# Patient Record
Sex: MALE | Race: WHITE | NOT HISPANIC OR LATINO | Employment: OTHER | URBAN - METROPOLITAN AREA
[De-identification: names, ages, dates, MRNs, and addresses within clinical notes are randomized per-mention and may not be internally consistent; named-entity substitution may affect disease eponyms.]

---

## 2018-11-19 ENCOUNTER — OFFICE VISIT (OUTPATIENT)
Dept: RADIATION ONCOLOGY | Facility: HOSPITAL | Age: 63
End: 2018-11-19

## 2018-11-19 ENCOUNTER — HOSPITAL ENCOUNTER (OUTPATIENT)
Dept: RADIATION ONCOLOGY | Facility: HOSPITAL | Age: 63
Setting detail: RADIATION/ONCOLOGY SERIES
Discharge: HOME | End: 2018-11-19

## 2018-11-19 ENCOUNTER — DOCUMENTATION (OUTPATIENT)
Dept: RADIATION ONCOLOGY | Facility: HOSPITAL | Age: 63
End: 2018-11-19

## 2018-11-19 VITALS
OXYGEN SATURATION: 95 % | DIASTOLIC BLOOD PRESSURE: 79 MMHG | HEART RATE: 105 BPM | SYSTOLIC BLOOD PRESSURE: 155 MMHG | HEIGHT: 70 IN | TEMPERATURE: 97 F | WEIGHT: 240.1 LBS | RESPIRATION RATE: 18 BRPM | BODY MASS INDEX: 34.37 KG/M2

## 2018-11-19 DIAGNOSIS — C61 PROSTATE CANCER (HCC): Primary | ICD-10-CM

## 2018-11-19 PROBLEM — N40.0 BPH (BENIGN PROSTATIC HYPERPLASIA): Status: ACTIVE | Noted: 2018-11-19

## 2018-11-19 PROBLEM — I10 HYPERTENSION: Status: ACTIVE | Noted: 2018-11-19

## 2018-11-19 PROBLEM — K21.9 GERD (GASTROESOPHAGEAL REFLUX DISEASE): Status: ACTIVE | Noted: 2018-11-19

## 2018-11-19 PROBLEM — E11.9 TYPE 2 DIABETES MELLITUS (HCC): Status: ACTIVE | Noted: 2018-11-19

## 2018-11-19 PROCEDURE — G0463 HOSPITAL OUTPT CLINIC VISIT: HCPCS | Performed by: RADIOLOGY

## 2018-11-19 RX ORDER — RANITIDINE 300 MG/1
TABLET ORAL
COMMUNITY

## 2018-11-19 RX ORDER — DUTASTERIDE 0.5 MG/1
CAPSULE, LIQUID FILLED ORAL
COMMUNITY
End: 2019-10-14

## 2018-11-19 RX ORDER — TAMSULOSIN HYDROCHLORIDE 0.4 MG/1
CAPSULE ORAL
COMMUNITY

## 2018-11-19 RX ORDER — AMLODIPINE BESYLATE 10 MG/1
TABLET ORAL
COMMUNITY

## 2018-11-19 RX ORDER — GABAPENTIN 800 MG/1
TABLET ORAL
COMMUNITY

## 2018-11-19 RX ORDER — POLYETHYLENE GLYCOL 3350 17 G/17G
17 POWDER, FOR SOLUTION ORAL DAILY
COMMUNITY

## 2018-11-19 RX ORDER — ROSUVASTATIN CALCIUM 10 MG/1
10 TABLET, COATED ORAL DAILY
COMMUNITY

## 2018-11-19 RX ORDER — TADALAFIL 5 MG/1
5 TABLET ORAL DAILY
Refills: 3 | COMMUNITY
Start: 2018-10-17 | End: 2018-11-19 | Stop reason: SDUPTHER

## 2018-11-19 RX ORDER — LANSOPRAZOLE 30 MG/1
30 CAPSULE, DELAYED RELEASE ORAL DAILY
Refills: 3 | COMMUNITY
Start: 2018-10-11

## 2018-11-19 RX ORDER — FLUTICASONE PROPIONATE 50 MCG
SPRAY, SUSPENSION (ML) NASAL
COMMUNITY

## 2018-11-19 RX ORDER — TADALAFIL 5 MG/1
TABLET ORAL
COMMUNITY

## 2018-11-19 RX ORDER — LOSARTAN POTASSIUM 100 MG/1
TABLET ORAL
COMMUNITY
End: 2019-03-19

## 2018-11-19 NOTE — PROGRESS NOTES
RADIATION ONCOLOGY PROGRESS NOTE  11/19/18    There were no vitals filed for this visit.  Pt notified of the following appts:  12/5/18- Markers and urolift with Dr. Harper ( office to mail packet with instructions and time)  12/19/18- @ 9:00 CK sim  @9:30-clinic  @1000-CT sim  @1030-MRI for 11:00 scan  Instructed on the importance of following the ck diet with gas -x 4 times per day starting 12/17/18.  instructed the pt to be NPO for 6 hours prior to the MRI and perform an enema the morning of 12/19/18. Pt verbalized understanding    Message to Gabi Hightower RD

## 2018-11-19 NOTE — PROGRESS NOTES
CONSULTATION NOTE      :                                                          1955  DATE OF CONSULTATION:                       2018   REQUESTING PHYSICIAN:                   Gomez Harper, *  REASON FOR CONSULTATION:           Prostate Cancer  Cancer Staging  Stage IIB (cT1c, cN0, cM0, PSA: 8.4, Grade Group: 2)    Thank you for requesting my services in evaluation of this pleasant individual.  I am seeing them in outpatient consultation regarding a diagnosis of     BRIEF HISTORY:  The patient is a very pleasant 63 y.o. male  with multiple medical comorbidities who presents for consultation regarding a new diagnosis of prostate cancer.  Mr. Kennedy has been followed for nearly a decade for symptoms of BPH and a elevated PSA.  By report he has had PSA values fluctuating between 5 and 10 over the years that is been attributed to BPH.  He takes both Flomax and Avodart, and has previously underwent 2 separate prostate biopsies both of which revealed no evidence of invasive malignancy.  More recently he presented for what he thought was a urinary tract infection, and when a PSA was checked revealing a level of 8.44, he was again referred for a prostate biopsy.  This time he underwent a cystoscopy as well as prostate saturation biopsies which identified a Tom 3+3=6 prostate adenocarcinoma in the right apex as well as a Tom 3+4 = 7 in the left mid gland.  He has had discussions with Dr. Harper regarding radical prostatectomy, but due to his medical comorbidities as well as his age, it was felt that he would be more appropriate for a primary radiation modality.  He is being referred to my clinic today for consideration of definitive radiation therapy.  From a symptomatic standpoint, he reports an IPS as score today of 11 with symptoms of incomplete emptying and frequency less than half the time, weak Stream approximately 50% of the time, and intermittency more than half the time.  As  mentioned, he takes Avodart and Flomax.  He reports that he is somewhat mixed with his current quality of life, and he scores low to moderate confidence with erectile dysfunction.  He denies any gastrointestinal complaints.    Allergies   Allergen Reactions   • Amoxicillin Rash   • Sulfamethoxazole-Trimethoprim Rash       Social History     Socioeconomic History   • Marital status: Unknown     Spouse name: Not on file   • Number of children: Not on file   • Years of education: Not on file   • Highest education level: Not on file   Tobacco Use   • Smoking status: Never Smoker   • Smokeless tobacco: Never Used   Substance and Sexual Activity   • Alcohol use: Yes     Comment: 1 beer per day   • Drug use: No   • Sexual activity: Defer       Past Medical History:   Diagnosis Date   • Diabetes mellitus (CMS/HCC)    • GERD (gastroesophageal reflux disease)    • Hypertension    • Prostate cancer (CMS/HCC)        family history is not on file.     Past Surgical History:   Procedure Laterality Date   • APPENDECTOMY     • COLONOSCOPY      5-6 years ago   • HERNIA REPAIR Bilateral         Review of Systems   HENT:   Positive for tinnitus.    Gastrointestinal: Positive for constipation (takes miralax).   Hematological: Bruises/bleeds easily.   All other systems reviewed and are negative.           IPSS Questionnaire (AUA-7):  Over the past month…    1)  Incomplete Emptying  How often have you had a sensation of not emptying your bladder?  2 - Less than half the time   2)  Frequency  How often have you had to urinate less than every two hours? 2 - Less than half the time   3)  Intermittency  How often have you found you stopped and started again several times when you urinated?  4 - More than half the time   4) Urgency  How often have you found it difficult to postpone urination?  0 - Not at all   5) Weak Stream  How often have you had a weak urinary stream?  3 - About half the time   6) Straining  How often have you had to push  "or strain to begin urination?  0 - Not at all   7) Nocturia  How many times did you typically get up at night to urinate?  0 - None   Total Score:  11       Quality of life due to urinary symptoms:  If you were to spend the rest of your life with your urinary condition the way it is now, how would you feel about that? 3-Mixed   Urine Leakage (Incontinence) 0-No Leakage     Sexual Health Inventory  Current Status    1)  How do you rate your confidence that you could achieve and keep an erection? 3-Moderate   2) When you had erections with sexual stimulation, how often were your erections hard enough for penetration (entering your partner)? 2-A few times (much less than half the time)   3)  During sexual intercourse, how often were you able to maintain your erection after you had penetrated (entered) into your partner? 3-Sometimes (about half the time)   4) During sexual intercourse, how difficult was it to maintain your erection to completion of intercourse? 2-Very difficult   5) When you attempted sexual intercourse, how often was it satisfactory to you? 1-Almost never or never   Total Score: 11       Bowel Health Inventory  Current Status: 0-No problems, no rectal bleeding, no discharge, less then 5 bowel movements a day               Objective   VITAL SIGNS:   Vitals:    11/19/18 0905   BP: 155/79   Pulse: 105   Resp: 18   Temp: 97 °F (36.1 °C)   TempSrc: Temporal   SpO2: 95%   Weight: 109 kg (240 lb 1.6 oz)   Height: 177.8 cm (70\")   PainSc: 0-No pain        Karnofsky score: 90        Physical Exam   Constitutional: He is oriented to person, place, and time. He appears well-developed and well-nourished. No distress.   HENT:   Head: Normocephalic and atraumatic.   Mouth/Throat: Oropharynx is clear and moist.   Eyes: Conjunctivae and EOM are normal. Pupils are equal, round, and reactive to light.   Neck: Normal range of motion. Neck supple.   Cardiovascular: Normal rate and regular rhythm. Exam reveals no friction " rub.   No murmur heard.  Pulmonary/Chest: Effort normal and breath sounds normal. He has no wheezes.   Abdominal: Soft. Bowel sounds are normal. He exhibits no distension and no mass. There is no tenderness.   Genitourinary:   Genitourinary Comments: 25gm prostate, no nodules or abnormalities   Musculoskeletal: Normal range of motion. He exhibits no edema.   Lymphadenopathy:     He has no cervical adenopathy.   Neurological: He is alert and oriented to person, place, and time.   Skin: Skin is warm and dry.   Psychiatric: He has a normal mood and affect. His behavior is normal. Judgment and thought content normal.   Nursing note and vitals reviewed.    PATHOLOGY  Prostate Biopsies 10/18/2018:  Left base: Benign prostatic tissue  Left mid-prostate adenocarcinoma, [place] score 3+4 = 7, one focus involving 1.5 mm of the core, representing 5% of submitted tissue  Left apex: Benign prostatic tissue  Right base: Benign prostatic tissue  Right mid benign prostatic tissue  Right apex-prostate adenocarcinoma Tom 3+3 equal 6 with 2 foci measuring 4 and 5 mm respectively involving 2 separate biopsy fragments.  Tumor represents 30% of submitted tissue  Left lateral base: Benign prostatic tissue  Left lateral mid: Benign prostatic tissue left lateral apex: Benign prostatic tissue  Right lateral base: Benign prostatic tissue   Right lateral mid: Benign prostatic tissue   Right lateral apex: Prostatic adenocarcinoma, Tom 3+3 = 6, single focus measuring 4 mm, representing 15% of submitted tissue  Left transitional: Benign prostatic tissue   Right transitional: Benign prostatic tissue       The following portions of the patient's history were reviewed and updated as appropriate: allergies, current medications, past family history, past medical history, past social history, past surgical history and problem list.    Assessment  Mr. Kennedy is a 63-year-old gentleman with multiple medical comorbidities who presents now with an  intermediate risk prostate adenocarcinoma, a clinical T1c, [place] 3+4 =, and pretreatment PSA of 8.44 (although adjusting for alpha-blocker therapy, his PSA would be 16.8).  I discussed several treatment options with him including surgical resection, prostate brachytherapy, standard external beam radiation therapy, as well as stereotactic body radiation therapy.  He is not a good candidate for active observation based on the age and [place] score of 7.  Further, when I described the procedure of prostate brachytherapy, he was not very interested and I fear that he would experience excessive urinary symptoms as his baseline IPSS score is 11 in the setting of dual therapy with Avodart and Flomax.  Considering he has a relatively small prostate, as long as there is no evidence of regional disease, I think he would be an excellent candidate for stereotactic body radiosurgery.  He has no evidence of extracapsular disease on exam and given the overall paucity of disease identified on his saturation biopsies, I think the likelihood that we will be able to control disease within the gland is very high and that he has a very low risk at present for having any extraprostatic disease.  After full explanation of the risks and benefits, he was most interested in pursuing prostate stereotactic radiosurgery.  I discussed the logistics with him.  I have quoted orders for a CT scan of the abdomen and pelvis as well as a nuclear medicine bone scan for baseline staging.  I'll send him back to Dr. Berrios's office to have fiducial placement as well as to undergo the planned Urolift procedure that he recommended.  Once this is completed, I will have him back in to my clinic with an MRI for planning and we will undergo the CyberKnife planning session.  I then anticipate that he should be ready to begin treatment approximately one to 2 weeks after pending insurance authorization.      RECOMMENDATIONS:      Return in about 1 month  (around 12/19/2018) for Radiation Simulation.  Napoleon was seen today for prostate cancer.    Diagnoses and all orders for this visit:    Prostate cancer (CMS/HCC)  -     NM Bone Scan Whole Body; Future  -     CT Pelvis With Contrast; Future  -     Creatinine, Serum; Future  -     MRI Cyberknife Pelvis Without Contrast; Future    Thank you for allowing me to participate in the care of this individual.    Sincerely,       Donte Dee MD

## 2018-11-21 ENCOUNTER — TELEPHONE (OUTPATIENT)
Dept: RADIATION ONCOLOGY | Facility: HOSPITAL | Age: 63
End: 2018-11-21

## 2018-11-21 NOTE — TELEPHONE ENCOUNTER
Pt left VM requesting a return call for a question. I called the pt back. Pt request clarification of upcoming appts. I explained the purpose of the CT and bone scan and went over again the planning sim and MRI as well as the importance of the CK diet with gas x, NPO for 6 hours prior to MRI and enema the morning of 12/19/18. Pt verbalized understanding. Pt encouraged to call with any further questions.

## 2018-11-28 ENCOUNTER — HOSPITAL ENCOUNTER (OUTPATIENT)
Dept: NUCLEAR MEDICINE | Facility: HOSPITAL | Age: 63
Discharge: HOME | End: 2018-11-28
Attending: RADIOLOGY

## 2018-11-28 ENCOUNTER — HOSPITAL ENCOUNTER (OUTPATIENT)
Dept: CT IMAGING | Facility: HOSPITAL | Age: 63
Discharge: HOME | End: 2018-11-28
Attending: RADIOLOGY | Admitting: RADIOLOGY

## 2018-11-28 DIAGNOSIS — C61 PROSTATE CANCER (HCC): ICD-10-CM

## 2018-11-28 PROCEDURE — 82565 ASSAY OF CREATININE: CPT

## 2018-11-28 PROCEDURE — A9503 TC99M MEDRONATE: HCPCS | Performed by: RADIOLOGY

## 2018-11-28 PROCEDURE — 0 TECHNETIUM MEDRONATE KIT: Performed by: RADIOLOGY

## 2018-11-28 PROCEDURE — 72193 CT PELVIS W/DYE: CPT

## 2018-11-28 PROCEDURE — 25010000002 IOPAMIDOL 61 % SOLUTION: Performed by: RADIOLOGY

## 2018-11-28 PROCEDURE — 78306 BONE IMAGING WHOLE BODY: CPT

## 2018-11-28 RX ORDER — TC 99M MEDRONATE 20 MG/10ML
24.7 INJECTION, POWDER, LYOPHILIZED, FOR SOLUTION INTRAVENOUS
Status: COMPLETED | OUTPATIENT
Start: 2018-11-28 | End: 2018-11-28

## 2018-11-28 RX ADMIN — IOPAMIDOL 95 ML: 612 INJECTION, SOLUTION INTRAVENOUS at 08:45

## 2018-11-28 RX ADMIN — Medication 24.7 MILLICURIE: at 07:52

## 2018-12-03 LAB — CREAT BLDA-MCNC: 1.2 MG/DL (ref 0.6–1.3)

## 2018-12-05 ENCOUNTER — TELEPHONE (OUTPATIENT)
Dept: RADIATION ONCOLOGY | Facility: HOSPITAL | Age: 63
End: 2018-12-05

## 2018-12-05 NOTE — TELEPHONE ENCOUNTER
Pt left VM to call him- called pt back. Pt states did not have markers placed today- insurance issues- pt will call when appt made for markers and we will reschedule re-eval and MRI if needed.- pt verbalized understanding

## 2018-12-10 ENCOUNTER — DOCUMENTATION (OUTPATIENT)
Dept: RADIATION ONCOLOGY | Facility: HOSPITAL | Age: 63
End: 2018-12-10

## 2018-12-10 NOTE — PROGRESS NOTES
RADIATION ONCOLOGY PROGRESS NOTE  12/10/18    Pt called to say he did not have markers placed. States his insurance denied the urolift and so he was waiting for approval and have markers and urolift at the same time. Pt instructed to notify me when marker appt is made. Explained treatment and planning are on hold until markers are placed. Pt verbalized understanding.

## 2018-12-13 ENCOUNTER — TELEPHONE (OUTPATIENT)
Dept: NUTRITION | Facility: HOSPITAL | Age: 63
End: 2018-12-13

## 2018-12-19 ENCOUNTER — APPOINTMENT (OUTPATIENT)
Dept: MRI IMAGING | Facility: HOSPITAL | Age: 63
End: 2018-12-19
Attending: RADIOLOGY

## 2019-01-28 ENCOUNTER — TELEPHONE (OUTPATIENT)
Dept: RADIATION ONCOLOGY | Facility: HOSPITAL | Age: 64
End: 2019-01-28

## 2019-01-28 NOTE — TELEPHONE ENCOUNTER
Pt left VM asking me to call- I called pt and he inquired if urolift is part of ck prostate treatment- I told the pt it is not- that we need his urologist to place 4-5 prostate markers for ck prostate treatment. Pt states he sees Dr. Harper tomorrow and will notify me when markers are placed so we can move forward with treatment planning-

## 2019-02-08 ENCOUNTER — TELEPHONE (OUTPATIENT)
Dept: RADIATION ONCOLOGY | Facility: HOSPITAL | Age: 64
End: 2019-02-08

## 2019-02-08 ENCOUNTER — TELEPHONE (OUTPATIENT)
Dept: NUTRITION | Facility: HOSPITAL | Age: 64
End: 2019-02-08

## 2019-02-08 DIAGNOSIS — C61 PROSTATE CANCER (HCC): Primary | ICD-10-CM

## 2019-02-08 NOTE — TELEPHONE ENCOUNTER
Called pt with the following appts:  2/15/19 @ 1000 clinic  @ 1030 CK sim  @ 1100 CT sim  @ 1130 MRI for noon scan    Educated pt on the importance of following the ck diet with gas-x 4 times per day starting 2/13/19.  Instructed to be NPO for 6 hours prior to MRI and to perform an enema the morning of 2/15/19.  Verbalized understanding.    Message to Gabi Hightower RT

## 2019-02-15 ENCOUNTER — OFFICE VISIT (OUTPATIENT)
Dept: RADIATION ONCOLOGY | Facility: HOSPITAL | Age: 64
End: 2019-02-15

## 2019-02-15 ENCOUNTER — HOSPITAL ENCOUNTER (OUTPATIENT)
Dept: RADIATION ONCOLOGY | Facility: HOSPITAL | Age: 64
Discharge: HOME | End: 2019-02-15

## 2019-02-15 ENCOUNTER — HOSPITAL ENCOUNTER (OUTPATIENT)
Dept: MRI IMAGING | Facility: HOSPITAL | Age: 64
Discharge: HOME | End: 2019-02-15
Admitting: RADIOLOGY

## 2019-02-15 ENCOUNTER — HOSPITAL ENCOUNTER (OUTPATIENT)
Dept: RADIATION ONCOLOGY | Facility: HOSPITAL | Age: 64
Setting detail: RADIATION/ONCOLOGY SERIES
Discharge: HOME | End: 2019-02-15

## 2019-02-15 VITALS
RESPIRATION RATE: 18 BRPM | DIASTOLIC BLOOD PRESSURE: 76 MMHG | HEART RATE: 94 BPM | OXYGEN SATURATION: 97 % | SYSTOLIC BLOOD PRESSURE: 139 MMHG | TEMPERATURE: 96.2 F | WEIGHT: 230.2 LBS | BODY MASS INDEX: 33.03 KG/M2

## 2019-02-15 DIAGNOSIS — C61 PROSTATE CANCER (HCC): ICD-10-CM

## 2019-02-15 PROCEDURE — 72195 MRI PELVIS W/O DYE: CPT

## 2019-02-15 PROCEDURE — G0463 HOSPITAL OUTPT CLINIC VISIT: HCPCS | Performed by: RADIOLOGY

## 2019-02-15 PROCEDURE — 77290 THER RAD SIMULAJ FIELD CPLX: CPT | Performed by: RADIOLOGY

## 2019-02-15 NOTE — PROGRESS NOTES
RE-EVALUATION    PATIENT:                                                      Napoleon Kennedy  :                                                          1955  DATE:                          2/15/2019   DIAGNOSIS:     Prostate cancer  Cancer Staging  Stage IIB (cT1c, cN0, cM0, PSA: 8.4, Grade Group: 2)       BRIEF HISTORY:  The patient is a very pleasant 63 y.o. male  with favorable intermediate risk prostate cancer who returns today for reevaluation prior to undergoing treatment using CyberKnife radiosurgery.  Since I last saw him in November, he has been seeking pre-treatment with a Urolift procedure, which unfortunately he has been unable to get this cleared through his insurance.  He therefore has had somewhat of a delay in starting treatment.  He eventually decided to no longer pursue that procedure, and successfully underwent fiducial placement on  and her returns today in order to undergo treatment planning and an MRI of the prostate.  From a symptomatic standpoint, he reports no change in his urinary complaints.  He remains on Flomax and Dutasteride.      Allergies   Allergen Reactions   • Amoxicillin Rash   • Sulfamethoxazole-Trimethoprim Rash       Review of Systems   HENT:   Positive for tinnitus.    All other systems reviewed and are negative.           IPSS Questionnaire (AUA-7):  Over the past month…     1)  Incomplete Emptying  How often have you had a sensation of not emptying your bladder?  2 - Less than half the time   2)  Frequency  How often have you had to urinate less than every two hours? 2 - Less than half the time   3)  Intermittency  How often have you found you stopped and started again several times when you urinated?  4 - More than half the time   4) Urgency  How often have you found it difficult to postpone urination?  0 - Not at all   5) Weak Stream  How often have you had a weak urinary stream?  3 - About half the time   6) Straining  How often have you had to push  or strain to begin urination?  0 - Not at all   7) Nocturia  How many times did you typically get up at night to urinate?  0 - None   Total Score:  11         Quality of life due to urinary symptoms:  If you were to spend the rest of your life with your urinary condition the way it is now, how would you feel about that? 3-Mixed   Urine Leakage (Incontinence) 0-No Leakage      Sexual Health Inventory  Current Status     1)  How do you rate your confidence that you could achieve and keep an erection? 3-Moderate   2) When you had erections with sexual stimulation, how often were your erections hard enough for penetration (entering your partner)? 2-A few times (much less than half the time)   3)  During sexual intercourse, how often were you able to maintain your erection after you had penetrated (entered) into your partner? 3-Sometimes (about half the time)   4) During sexual intercourse, how difficult was it to maintain your erection to completion of intercourse? 2-Very difficult   5) When you attempted sexual intercourse, how often was it satisfactory to you? 1-Almost never or never   Total Score: 11         Bowel Health Inventory  Current Status: 0-No problems, no rectal bleeding, no discharge, less then 5 bowel movements a day                       Objective   VITAL SIGNS:   Vitals:    02/15/19 1004   BP: 139/76   Pulse: 94   Resp: 18   Temp: 96.2 °F (35.7 °C)   TempSrc: Temporal   SpO2: 97%   Weight: 104 kg (230 lb 3.2 oz)   PainSc: 0-No pain        KPS 90%    Physical Exam   Constitutional: He is oriented to person, place, and time. He appears well-developed and well-nourished. No distress.   HENT:   Head: Normocephalic and atraumatic.   Mouth/Throat: Oropharynx is clear and moist.   Eyes: Conjunctivae and EOM are normal. Pupils are equal, round, and reactive to light.   Neck: Normal range of motion. Neck supple.   Cardiovascular: Normal rate and regular rhythm. Exam reveals no friction rub.   No murmur  heard.  Pulmonary/Chest: Effort normal and breath sounds normal. He has no wheezes.   Abdominal: Soft. Bowel sounds are normal. He exhibits no distension and no mass. There is no tenderness.   Musculoskeletal: Normal range of motion. He exhibits no edema.   Lymphadenopathy:     He has no cervical adenopathy.   Neurological: He is alert and oriented to person, place, and time.   Skin: Skin is warm and dry.   Psychiatric: He has a normal mood and affect. His behavior is normal. Judgment and thought content normal.   Nursing note and vitals reviewed.           The following portions of the patient's history were reviewed and updated as appropriate: allergies, past family history, past medical history, past social history, past surgical history and problem list.    Diagnoses and all orders for this visit:    Prostate cancer (CMS/[facility])      IMPRESSION:  Mr. Kennedy is a 63 year old gentleman with favorable intermediate risk prostate cancer.  He has completed the preparatory diet and bowel prep.  We will complete his radiation setup today and his pelvic MRI.  I will be working with my physics team over the next week to create his radiation treatment plan for Cyberknife which should consist of 35 Gy in 5 fractions of 7 Gy each, and I anticipate that we should be ready to begin his treatments in approximately 1-2 weeks.    RECOMMENDATIONS:    Return to clinic in 1-2 weeks in order to begin radiation therapy       Donte Dee MD

## 2019-02-21 PROCEDURE — 77300 RADIATION THERAPY DOSE PLAN: CPT | Performed by: RADIOLOGY

## 2019-02-21 PROCEDURE — 77295 3-D RADIOTHERAPY PLAN: CPT | Performed by: RADIOLOGY

## 2019-02-21 PROCEDURE — 77334 RADIATION TREATMENT AID(S): CPT | Performed by: RADIOLOGY

## 2019-03-04 ENCOUNTER — HOSPITAL ENCOUNTER (OUTPATIENT)
Dept: RADIATION ONCOLOGY | Facility: HOSPITAL | Age: 64
Discharge: HOME | End: 2019-03-04

## 2019-03-04 ENCOUNTER — HOSPITAL ENCOUNTER (OUTPATIENT)
Dept: RADIATION ONCOLOGY | Facility: HOSPITAL | Age: 64
Setting detail: RADIATION/ONCOLOGY SERIES
Discharge: HOME | End: 2019-03-04

## 2019-03-04 PROCEDURE — 77280 THER RAD SIMULAJ FIELD SMPL: CPT | Performed by: RADIOLOGY

## 2019-03-04 PROCEDURE — 77373 STRTCTC BDY RAD THER TX DLVR: CPT | Performed by: RADIOLOGY

## 2019-03-05 ENCOUNTER — HOSPITAL ENCOUNTER (OUTPATIENT)
Dept: RADIATION ONCOLOGY | Facility: HOSPITAL | Age: 64
Discharge: HOME | End: 2019-03-05

## 2019-03-05 ENCOUNTER — TELEPHONE (OUTPATIENT)
Dept: RADIATION ONCOLOGY | Facility: HOSPITAL | Age: 64
End: 2019-03-05

## 2019-03-05 PROCEDURE — 77373 STRTCTC BDY RAD THER TX DLVR: CPT | Performed by: RADIOLOGY

## 2019-03-05 NOTE — TELEPHONE ENCOUNTER
Pt left VM to call stating unable to empty bladder- after discussion with Dr. Dee I called pt to instruct to take flomax and ibuprofen as instructed earlier by Dr. Dee. Pt states he did both 15 minutes prior but was concerned. Pt instructed to call on-call  If trouble thru the evening and per Dr. Dee to take tomorrow off treatment and resume Thursday. Verbalized understanding. Pt instructed to call in the a.m. With status or I will call him

## 2019-03-06 ENCOUNTER — DOCUMENTATION (OUTPATIENT)
Dept: RADIATION ONCOLOGY | Facility: HOSPITAL | Age: 64
End: 2019-03-06

## 2019-03-06 DIAGNOSIS — N41.0 PROSTATITIS, ACUTE: Primary | ICD-10-CM

## 2019-03-06 RX ORDER — ALFUZOSIN HYDROCHLORIDE 10 MG/1
10 TABLET, EXTENDED RELEASE ORAL DAILY
Qty: 30 TABLET | Refills: 1 | Status: SHIPPED | OUTPATIENT
Start: 2019-03-06 | End: 2019-03-28 | Stop reason: SDUPTHER

## 2019-03-06 NOTE — PROGRESS NOTES
TELEPHONE NOTE:  I called Mr. Kennedy this morning to follow-up on his symptoms.  He started CyberKnife radiosurgery on Monday, March 4, 2019 for his favorable risk prostate cancer.  He has a long-standing history of lower urinary tract symptoms and has been on medications including dutasteride and tamsulosin.  I saw him in clinic yesterday in conjunction with his second CyberKnife treatment where he described some rather early onset symptoms of urinary urgency, frequency, and weak stream that is more than we would typically expect following his first CyberKnife treatment.  He was instructed to double up on his tamsulosin and to consider adding ibuprofen for its anti-inflammatory effects.  Unfortunately, last evening he called my clinic reporting significantly diminished urinary stream for which he received additional instructions from my nurse to again increase his Flomax and to take ibuprofen, for which she had not yet started.  Overnight, he reports that his urine stream became worse and he had the point where he felt like he was unable to void.  He went to an outside hospital emergency department where a Alves catheter was placed.  I called and discussed his symptoms with him today, and his primary complaint is related to weak stream and the inability to void.  He denied having any symptoms of spasms, severe pain, or bleeding.  He denies fevers or chills.    I reviewed his treatment plan as well as his MRI which did not have anything that clearly stood out as the root cause of his symptoms.  Overall, he has a small prostate that on his CyberKnife planning measured only 19 cm³.  I believe his symptoms are likely due to an underlying chronic prostatitis and potentially diminished effectiveness of tamsulosin due to his long-term use.  I recommended and sent in a new prescription for alfuzosin and encouraged him to take Cystex.  I would like him to take ibuprofen every 6 hours to reduce inflammation and I will  coordinate having him back in my clinic tomorrow for reevaluation and whether he is seen by Dr. Berrios or myself, 1 of us will perform a voiding trial and see if we cannot get the catheter removed.  If his symptoms do not improve with the addition of the second alpha blocker, then I will consider starting him on empiric antibiotics for the management of acute on chronic prostatitis.

## 2019-03-07 DIAGNOSIS — N41.0 ACUTE PROSTATITIS: Primary | ICD-10-CM

## 2019-03-07 RX ORDER — LEVOFLOXACIN 500 MG/1
500 TABLET, FILM COATED ORAL DAILY
Qty: 7 TABLET | Refills: 0 | Status: SHIPPED | OUTPATIENT
Start: 2019-03-07 | End: 2019-04-09

## 2019-03-11 ENCOUNTER — HOSPITAL ENCOUNTER (OUTPATIENT)
Dept: RADIATION ONCOLOGY | Facility: HOSPITAL | Age: 64
Discharge: HOME | End: 2019-03-11

## 2019-03-11 PROCEDURE — 77373 STRTCTC BDY RAD THER TX DLVR: CPT | Performed by: RADIOLOGY

## 2019-03-13 ENCOUNTER — HOSPITAL ENCOUNTER (OUTPATIENT)
Dept: RADIATION ONCOLOGY | Facility: HOSPITAL | Age: 64
Discharge: HOME | End: 2019-03-13

## 2019-03-13 PROCEDURE — 77373 STRTCTC BDY RAD THER TX DLVR: CPT | Performed by: RADIOLOGY

## 2019-03-14 ENCOUNTER — HOSPITAL ENCOUNTER (OUTPATIENT)
Dept: RADIATION ONCOLOGY | Facility: HOSPITAL | Age: 64
Discharge: HOME | End: 2019-03-14

## 2019-03-14 PROCEDURE — 77373 STRTCTC BDY RAD THER TX DLVR: CPT | Performed by: RADIOLOGY

## 2019-03-14 PROCEDURE — 77336 RADIATION PHYSICS CONSULT: CPT | Performed by: RADIOLOGY

## 2019-03-15 ENCOUNTER — APPOINTMENT (OUTPATIENT)
Dept: RADIATION ONCOLOGY | Facility: HOSPITAL | Age: 64
End: 2019-03-15

## 2019-03-19 ENCOUNTER — OFFICE VISIT (OUTPATIENT)
Dept: RADIATION ONCOLOGY | Facility: HOSPITAL | Age: 64
End: 2019-03-19

## 2019-03-19 VITALS
BODY MASS INDEX: 33.22 KG/M2 | SYSTOLIC BLOOD PRESSURE: 142 MMHG | WEIGHT: 231.5 LBS | OXYGEN SATURATION: 96 % | HEART RATE: 101 BPM | RESPIRATION RATE: 18 BRPM | TEMPERATURE: 96.7 F | DIASTOLIC BLOOD PRESSURE: 75 MMHG

## 2019-03-19 DIAGNOSIS — C61 PROSTATE CANCER (HCC): ICD-10-CM

## 2019-03-19 PROCEDURE — G0463 HOSPITAL OUTPT CLINIC VISIT: HCPCS | Performed by: RADIOLOGY

## 2019-03-19 RX ORDER — LOSARTAN POTASSIUM AND HYDROCHLOROTHIAZIDE 25; 100 MG/1; MG/1
1 TABLET ORAL DAILY
Refills: 5 | COMMUNITY
Start: 2019-02-16

## 2019-03-19 NOTE — PROGRESS NOTES
FOLLOW UP NOTE    PATIENT:                                                      Napoleon Kennedy  MEDICAL RECORD #:                        [id number]  :                                                          1955  COMPLETION DATE:    3/14/2019  DIAGNOSIS:     Cancer Staging  Prostate cancer (CMS/formerly [facility])  Staging form: Prostate, AJCC 8th Edition  - Clinical stage from 10/18/2018: Stage IIB (cT1c, cN0, cM0, PSA: 8.4, Grade Group: 2) - Signed by Donte Dee MD on 2018    BRIEF HISTORY:  Mr. Kennedy returns today for a short interval follow-up visit.  He completed Cyberknife radiosurgery last Wednesday on the , but due to his persistent lower urinary tract symptoms and inability to empty his bladder, we completed his radiation treatments with an indwelling lopez catheter.  He returns today for a planned voiding trial.  He denies any new complaints.    MEDICATIONS: Medication reconciliation for the patient was reviewed and confirmed in the electronic medical record.    Review of Systems   HENT:   Positive for hearing loss and tinnitus.    Gastrointestinal:        Lopez cath in place   Genitourinary:         Lopez cath in place       KPS 80%    Physical Exam   Constitutional: He is oriented to person, place, and time. He appears well-developed and well-nourished. No distress.   HENT:   Head: Normocephalic and atraumatic.   Mouth/Throat: Oropharynx is clear and moist.   Eyes: Conjunctivae and EOM are normal. Pupils are equal, round, and reactive to light.   Neck: Normal range of motion. Neck supple.   Cardiovascular: Normal rate and regular rhythm. Exam reveals no friction rub.   No murmur heard.  Pulmonary/Chest: Effort normal and breath sounds normal. He has no wheezes.   Abdominal: Soft. Bowel sounds are normal. He exhibits no distension and no mass. There is no tenderness.   Genitourinary:   Genitourinary Comments: Lopez catheter with some straw colored urine in the bag   Musculoskeletal:  Normal range of motion. He exhibits no edema.   Lymphadenopathy:     He has no cervical adenopathy.   Neurological: He is alert and oriented to person, place, and time.   Skin: Skin is warm and dry.   Psychiatric: He has a normal mood and affect. His behavior is normal. Judgment and thought content normal.   Nursing note and vitals reviewed.      VITAL SIGNS:   Vitals:    03/19/19 0922   BP: 142/75   Pulse: 101   Resp: 18   Temp: 96.7 °F (35.9 °C)   TempSrc: Temporal   SpO2: 96%   Weight: 105 kg (231 lb 8 oz)   PainSc: 0-No pain       The following portions of the patient's history were reviewed and updated as appropriate: allergies, current medications, past family history, past medical history, past social history, past surgical history and problem list.         Napoleon was seen today for prostate cancer.    Diagnoses and all orders for this visit:    Prostate cancer (CMS/[facility])         IMPRESSION:  Mr. Kennedy is a 63 year old gentleman with early stage favorable risk prostate cancer.  He has completed Cyberknife Radiosurgery but required an indwelling lopez catheter to complete treatment.  I personally removed the catheter in my clinic today and performed a voiding trial.  I instilled 120cc of sterile water into the bladder and he was able to immediately void out 100+ cc of fluid.  I encouraged him to continue taking Uroxatrol and Flomax, and will have him back in 2-3 weeks for follow-up.  I again discussed what he might expect over the next several days with regards to frequency, burning, straining, etc.  We will also be in contact at least over the phone to confirm that he is doing well without the catheter.    RECOMMENDATIONS:      Return in about 2 weeks (around 4/2/2019) for Office Visit.    Donte Dee MD    Errors in dictation may reflect use of voice recognition software and not all errors in transcription may have been detected prior to signing.

## 2019-03-21 ENCOUNTER — DOCUMENTATION (OUTPATIENT)
Dept: RADIATION ONCOLOGY | Facility: HOSPITAL | Age: 64
End: 2019-03-21

## 2019-03-21 NOTE — PROGRESS NOTES
TELEPHONE  I spoke with Mr. Kennedy this morning, who unfortunately reports that he has been having urinary retention again over the past 24 hours.  I last saw him 2 days ago when we performed a voiding trial and he was able to empty his bladder.  At this point I think the most appropriate next step is to place the Alves catheter again versus in and out self catheterizations.  I personally contacted Dr. Gomez Harper at the [facility], who agreed to see Mr. Kennedy today.  His nursing staff will evaluate him and will provide catheters as well as teaching so that he can self cath himself.

## 2019-03-28 DIAGNOSIS — N41.0 PROSTATITIS, ACUTE: ICD-10-CM

## 2019-03-28 RX ORDER — ALFUZOSIN HYDROCHLORIDE 10 MG/1
10 TABLET, EXTENDED RELEASE ORAL DAILY
Qty: 30 TABLET | Refills: 1 | Status: SHIPPED | OUTPATIENT
Start: 2019-03-28

## 2019-04-09 ENCOUNTER — OFFICE VISIT (OUTPATIENT)
Dept: RADIATION ONCOLOGY | Facility: HOSPITAL | Age: 64
End: 2019-04-09

## 2019-04-09 ENCOUNTER — HOSPITAL ENCOUNTER (OUTPATIENT)
Dept: RADIATION ONCOLOGY | Facility: HOSPITAL | Age: 64
Setting detail: RADIATION/ONCOLOGY SERIES
Discharge: HOME | End: 2019-04-09

## 2019-04-09 VITALS
TEMPERATURE: 96.5 F | DIASTOLIC BLOOD PRESSURE: 66 MMHG | OXYGEN SATURATION: 97 % | BODY MASS INDEX: 32.89 KG/M2 | SYSTOLIC BLOOD PRESSURE: 114 MMHG | RESPIRATION RATE: 18 BRPM | HEART RATE: 102 BPM | WEIGHT: 229.2 LBS

## 2019-04-09 DIAGNOSIS — C61 PROSTATE CANCER (HCC): ICD-10-CM

## 2019-04-09 PROCEDURE — G0463 HOSPITAL OUTPT CLINIC VISIT: HCPCS | Performed by: RADIOLOGY

## 2019-04-09 NOTE — PROGRESS NOTES
FOLLOW UP NOTE    PATIENT:                                                      Napoleon Kennedy  MEDICAL RECORD #:                        [id number]  :                                                          1955  COMPLETION DATE:    3/14/2019  DIAGNOSIS:     Cancer Staging  Prostate cancer (CMS/[facility])  Staging form: Prostate, AJCC 8th Edition  - Clinical stage from 10/18/2018: Stage IIB (cT1c, cN0, cM0, PSA: 8.4, Grade Group: 2) - Signed by Donte Dee MD on 2018    BRIEF HISTORY:  Mr. Kennedy returns to clinic today for a 3 week follow-up visit after completing Cyberknife radiosurgery for a clinical T1c, [place] 3+4=7 prostate cancer with a pretreatment PSA of 8.4.  He unfortunately developed urinary retention during his treatments requiring placement of an indwelling lopez catheter.  We completed a voiding trial in the week following treatment that was initially successful, but he redeveloped urinary retention and then completed self in and out catheterizations for 1 week.  He returns to clinic today and states that he is feeling significantly better.  He has not been performing in and out catheterizations for at least 2 weeks now and subjectively feels like his urine symptoms are better than they were prior to undergoing stereotactic body radiation therapy.  He continues on Uroxatral and Flomax, but reports he feels ready and is anxious to begin tapering off 1 of these.  He denies any other complaints.    MEDICATIONS: Medication reconciliation for the patient was reviewed and confirmed in the electronic medical record.    Review of Systems   HENT:   Positive for tinnitus.    Neurological: Positive for dizziness.   All other systems reviewed and are negative.        IPSS Questionnaire (AUA-7):  Over the past month…    1)  Incomplete Emptying  How often have you had a sensation of not emptying your bladder?  3 - About half the time   2)  Frequency  How often have you had to urinate less than  every two hours? 3 - About half the time   3)  Intermittency  How often have you found you stopped and started again several times when you urinated?  3 - About half the time   4) Urgency  How often have you found it difficult to postpone urination?  1 - Less than 1 time in 5   5) Weak Stream  How often have you had a weak urinary stream?  2 - Less than half the time   6) Straining  How often have you had to push or strain to begin urination?  0 - Not at all   7) Nocturia  How many times did you typically get up at night to urinate?  1 - 1 time   Total Score:  13       Quality of life due to urinary symptoms:  If you were to spend the rest of your life with your urinary condition the way it is now, how would you feel about that? 2-Mostly Satisfied   Urine Leakage (Incontinence) 0-No Leakage     Sexual Health Inventory  Current Status    1)  How do you rate your confidence that you could achieve and keep an erection? 3-Moderate   2) When you had erections with sexual stimulation, how often were your erections hard enough for penetration (entering your partner)? 2-A few times (much less than half the time)   3)  During sexual intercourse, how often were you able to maintain your erection after you had penetrated (entered) into your partner? 2-A few times (much less than half the time)   4) During sexual intercourse, how difficult was it to maintain your erection to completion of intercourse? 3-difficult   5) When you attempted sexual intercourse, how often was it satisfactory to you? 3-Sometime (about half the time)   Total Score: 13       Bowel Health Inventory  Current Status: 0-No problems, no rectal bleeding, no discharge, less then 5 bowel movements a day           KPS 90%    Physical Exam   Constitutional: He is oriented to person, place, and time. He appears well-developed and well-nourished. No distress.   HENT:   Head: Normocephalic and atraumatic.   Mouth/Throat: Oropharynx is clear and moist.   Eyes:  Conjunctivae and EOM are normal. Pupils are equal, round, and reactive to light.   Neck: Normal range of motion. Neck supple.   Cardiovascular: Normal rate and regular rhythm. Exam reveals no friction rub.   No murmur heard.  Pulmonary/Chest: Effort normal and breath sounds normal. He has no wheezes.   Abdominal: Soft. Bowel sounds are normal. He exhibits no distension and no mass. There is no tenderness.   Musculoskeletal: Normal range of motion. He exhibits no edema.   Lymphadenopathy:     He has no cervical adenopathy.   Neurological: He is alert and oriented to person, place, and time.   Skin: Skin is warm and dry.   Psychiatric: He has a normal mood and affect. His behavior is normal. Judgment and thought content normal.   Nursing note and vitals reviewed.      VITAL SIGNS:   Vitals:    04/09/19 1351   BP: 114/66   Pulse: 102   Resp: 18   Temp: 96.5 °F (35.8 °C)   TempSrc: Temporal   SpO2: 97%   Weight: 104 kg (229 lb 3.2 oz)   PainSc: 0-No pain       The following portions of the patient's history were reviewed and updated as appropriate: allergies, current medications, past family history, past medical history, past social history, past surgical history and problem list.    PSA from [facility], 1st week in April: 2.82 by report       Napoleon was seen today for prostate cancer.    Diagnoses and all orders for this visit:    Prostate cancer (CMS/[facility])    IMPRESSION:  Mr. Kennedy is a 63 year old gentleman with favorable intermediate risk prostate cancer who has completed Cyberknife radiosurgery.  I am very pleased with his progress and resolution of his lower urinary tract symptoms at this point.  He is also had a very nice reduction in his PSA.  He is scheduled to be seen later this week again at [facility].  With respect to his medications, I anticipate that we can begin weaning off of his Uroxatral and I discussed strategies with him for doing this.  I will see him back in 6  months.    RECOMMENDATIONS:      Return in about 6 months (around 10/9/2019) for Office Visit.    Donte Dee MD    Errors in dictation may reflect use of voice recognition software and not all errors in transcription may have been detected prior to signing.

## 2019-10-14 ENCOUNTER — HOSPITAL ENCOUNTER (OUTPATIENT)
Dept: RADIATION ONCOLOGY | Facility: HOSPITAL | Age: 64
Setting detail: RADIATION/ONCOLOGY SERIES
Discharge: HOME | End: 2019-10-14

## 2019-10-14 ENCOUNTER — OFFICE VISIT (OUTPATIENT)
Dept: RADIATION ONCOLOGY | Facility: HOSPITAL | Age: 64
End: 2019-10-14

## 2019-10-14 VITALS
HEART RATE: 98 BPM | WEIGHT: 233.2 LBS | TEMPERATURE: 97.1 F | HEIGHT: 68 IN | BODY MASS INDEX: 35.34 KG/M2 | OXYGEN SATURATION: 97 % | DIASTOLIC BLOOD PRESSURE: 68 MMHG | RESPIRATION RATE: 17 BRPM | SYSTOLIC BLOOD PRESSURE: 112 MMHG

## 2019-10-14 DIAGNOSIS — C61 PROSTATE CANCER (HCC): ICD-10-CM

## 2019-10-14 PROCEDURE — G0463 HOSPITAL OUTPT CLINIC VISIT: HCPCS | Performed by: RADIOLOGY

## 2019-10-14 NOTE — PROGRESS NOTES
FOLLOW UP NOTE    PATIENT:                                                      Napoleon Kennedy  MEDICAL RECORD #:                        [id number]  :                                                          1955  COMPLETION DATE:    3/14/2019    DIAGNOSIS:     Cancer Staging  Prostate cancer (CMS/[facility])  Staging form: Prostate, AJCC 8th Edition  - Clinical stage from 10/18/2018: Stage IIB (cT1c, cN0, cM0, PSA: 8.4, Grade Group: 2) - Signed by Donte Dee MD on 2018    BRIEF HISTORY:  Mr. Kennedy returns to clinic today for follow-up related to his early stage, favorable intermediate risk prostate cancer.  He has a long-standing history of BPH, and underwent treatment consisting of CyberKnife radiosurgery, completing treatments in 2019.  Unfortunately, his treatments were complicated by urinary retention that required placement of a temporary Alves catheter.  Since completing treatment, he has been able to have the catheter removed and he has slowly but surely been able to back off some of his medications.  He is no longer taking dutasteride, and remains on Flomax and Alfuzosin daily.  He is very pleased, and reports an IPSS score today of 1 with symptoms only of intermittency less than 20% of the time.  He denies any other complaints.    MEDICATIONS: Medication reconciliation for the patient was reviewed and confirmed in the electronic medical record.    Review of Systems   HENT:   Positive for tinnitus.    Neurological: Positive for dizziness (upon standing-states r/t meds).   All other systems reviewed and are negative.        IPSS Questionnaire (AUA-7):  Over the past month…    1)  Incomplete Emptying  How often have you had a sensation of not emptying your bladder?  0 - Not at all   2)  Frequency  How often have you had to urinate less than every two hours? 0 - Not at all   3)  Intermittency  How often have you found you stopped and started again several times when you urinated?  1  - Less than 1 time in 5   4) Urgency  How often have you found it difficult to postpone urination?  0 - Not at all   5) Weak Stream  How often have you had a weak urinary stream?  0 - Not at all   6) Straining  How often have you had to push or strain to begin urination?  0 - Not at all   7) Nocturia  How many times did you typically get up at night to urinate?  0 - None   Total Score:  1       Quality of life due to urinary symptoms:  If you were to spend the rest of your life with your urinary condition the way it is now, how would you feel about that? 1-Pleased   Urine Leakage (Incontinence) 0-No Leakage     Sexual Health Inventory  Current Status    1)  How do you rate your confidence that you could achieve and keep an erection? 3-Moderate   2) When you had erections with sexual stimulation, how often were your erections hard enough for penetration (entering your partner)? 2-A few times (much less than half the time)   3)  During sexual intercourse, how often were you able to maintain your erection after you had penetrated (entered) into your partner? 2-A few times (much less than half the time)   4) During sexual intercourse, how difficult was it to maintain your erection to completion of intercourse? 2-Very difficult   5) When you attempted sexual intercourse, how often was it satisfactory to you? 2-A few times (much less than half the time)   Total Score: 11       Bowel Health Inventory  Current Status: 0-No problems, no rectal bleeding, no discharge, less then 5 bowel movements a day           KPS 90%    Physical Exam   Constitutional: He is oriented to person, place, and time. He appears well-developed and well-nourished. No distress.   HENT:   Head: Normocephalic and atraumatic.   Mouth/Throat: Oropharynx is clear and moist.   Eyes: Conjunctivae and EOM are normal. Pupils are equal, round, and reactive to light.   Neck: Normal range of motion. Neck supple.   Cardiovascular: Normal rate and regular rhythm.  "Exam reveals no friction rub.   No murmur heard.  Pulmonary/Chest: Effort normal and breath sounds normal. He has no wheezes.   Abdominal: Soft. Bowel sounds are normal. He exhibits no distension and no mass. There is no tenderness.   Genitourinary:   Genitourinary Comments: 30 g prostate without nodularity or asymmetry   Musculoskeletal: Normal range of motion. He exhibits no edema.   Lymphadenopathy:     He has no cervical adenopathy.   Neurological: He is alert and oriented to person, place, and time.   Skin: Skin is warm and dry.   Psychiatric: He has a normal mood and affect. His behavior is normal. Judgment and thought content normal.   Nursing note and vitals reviewed.      VITAL SIGNS:   Vitals:    10/14/19 1408   BP: 112/68   Pulse: 98   Resp: 17   Temp: 97.1 °F (36.2 °C)   TempSrc: Temporal   SpO2: 97%  Comment: RA   Weight: 106 kg (233 lb 3.2 oz)   Height: 172.7 cm (68\")   PainSc: 0-No pain       The following portions of the patient's history were reviewed and updated as appropriate: allergies, current medications, past family history, past medical history, past social history, past surgical history and problem list.    LABORATORY  PSA 4/4/2019: 2.82 NG/mL  PSA 7/8/2019: 0.73 NG/mL       Napoleon was seen today for follow-up and prostate cancer.    Diagnoses and all orders for this visit:    Prostate cancer (CMS/[facility])         IMPRESSION:  Mr. Kennedy is a 64 year old gentleman with a medical T1c, [place] 3+4 = 7 prostate adenocarcinoma with a pretreatment PSA of 8.4 NG/mL.  He is 7 months out from completion of CyberKnife radiosurgery and appears to be doing much better and his PSA has had a very good clinical response, the most recent value being 0.73 NG/mL.  I discussed the long-term follow-up plans with him in ongoing surveillance using his PSA.  He is already scheduled to be seen again after the new year by  with a repeat PSA.  I scheduled him to return to my clinic in 1 " year.    RECOMMENDATIONS:      Return in about 1 year (around 10/14/2020) for Office Visit.    Donte Dee MD    Errors in dictation may reflect use of voice recognition software and not all errors in transcription may have been detected prior to signing.

## 2020-10-15 ENCOUNTER — HOSPITAL ENCOUNTER (OUTPATIENT)
Dept: RADIATION ONCOLOGY | Facility: HOSPITAL | Age: 65
Setting detail: RADIATION/ONCOLOGY SERIES
Discharge: HOME | End: 2020-10-15